# Patient Record
Sex: MALE | Race: WHITE | NOT HISPANIC OR LATINO | URBAN - METROPOLITAN AREA
[De-identification: names, ages, dates, MRNs, and addresses within clinical notes are randomized per-mention and may not be internally consistent; named-entity substitution may affect disease eponyms.]

---

## 2019-08-21 ENCOUNTER — OFFICE VISIT - RIVER FALLS (OUTPATIENT)
Dept: FAMILY MEDICINE | Facility: CLINIC | Age: 24
End: 2019-08-21

## 2019-08-30 ENCOUNTER — OFFICE VISIT - RIVER FALLS (OUTPATIENT)
Dept: FAMILY MEDICINE | Facility: CLINIC | Age: 24
End: 2019-08-30

## 2022-02-12 VITALS — HEART RATE: 68 BPM | SYSTOLIC BLOOD PRESSURE: 120 MMHG | DIASTOLIC BLOOD PRESSURE: 72 MMHG

## 2022-02-12 VITALS — TEMPERATURE: 97.8 F | HEART RATE: 60 BPM | DIASTOLIC BLOOD PRESSURE: 82 MMHG | SYSTOLIC BLOOD PRESSURE: 136 MMHG

## 2022-02-16 NOTE — NURSING NOTE
Comprehensive Intake Entered On:  8/21/2019 5:21 PM CDT    Performed On:  8/21/2019 5:18 PM CDT by Alee Smith RN               Summary   Chief Complaint :   Patient is here for laceration in between first and second finger on left hand. Happened about an hour ago. Work Comp.    Ht/Wt Measurement Refused by Patient? :   Yes   Systolic Blood Pressure :   120 mmHg   Diastolic Blood Pressure :   72 mmHg   Mean Arterial Pressure :   88 mmHg   Peripheral Pulse Rate :   68 bpm   BP Site :   Right arm   Pulse Site :   Radial artery   BP Method :   Electronic   HR Method :   Manual   Alee Smith RN - 8/21/2019 5:18 PM CDT   Health Status   Allergies Verified? :   Yes   Medication History Verified? :   Yes   Pre-Visit Planning Status :   Not completed   Alee Smith RN - 8/21/2019 5:18 PM CDT   Meds / Allergies   (As Of: 8/21/2019 5:21:10 PM CDT)   Allergies (Active)   No Known Medication Allergies  Estimated Onset Date:   Unspecified ; Created By:   Alee Smith RN; Reaction Status:   Active ; Category:   Drug ; Substance:   No Known Medication Allergies ; Type:   Allergy ; Updated By:   Alee Smith RN; Reviewed Date:   8/21/2019 5:19 PM CDT        Medication List   (As Of: 8/21/2019 5:21:10 PM CDT)

## 2022-02-16 NOTE — PROGRESS NOTES
Chief Complaint    Work comp f/u. Remove stitches.  History of Present Illness      Chief complaint as above reviewed and confirmed with patient.  Pt presents to the clinic with concerns re: suture removal L hand, WC injury 8-21-9.  Pt state he has been using hand well at work.  Avoiding a lot of Abduction but other than that very functional.  No redness, swelling, discharge. no fevers or chills.          Review of Systems      Review of systems is negative with the exception of those noted in HPI          Physical Exam   Vitals & Measurements    T: 97.8   F (Tympanic)  HR: 60(Peripheral)  BP: 136/82            exam of the L hand 2nd web space reveals  sutures to be healing well without signs of infection.       sutures removed without difficulty x 3 and steri strips placed.  pt tolerated well.          Assessment/Plan       1. Visit for suture removal (Z48.02)         keep wound clean and dry, return to work without restrictions. fu prn.  Patient Information     Name:PORFIRIO HILL      Address:      [address] [place] [id number]     Sex:Male     YOB: 1995     [phone]     MRN:[id number]     FIN:[id number]     Location:[facility]     Date of Service:08/30/2019      Primary Care Physician:       NONE ,       Attending Physician:       Cole BELTRE, Julieta ROJAS, [phone]  Problem List/Past Medical History    Ongoing     No qualifying data    Historical     No qualifying data  Medications   No active medications  Allergies    No Known Medication Allergies  Immunizations      Vaccine Date Status      tetanus/diphth/pertuss (Tdap) adult/adol 08/21/2019 Given

## 2022-02-16 NOTE — NURSING NOTE
Comprehensive Intake Entered On:  8/30/2019 4:04 PM CDT    Performed On:  8/30/2019 4:01 PM CDT by Jennifer Novak               Summary   Chief Complaint :   Work comp f/u. Remove stitches.    Systolic Blood Pressure :   136 mmHg (HI)    Diastolic Blood Pressure :   82 mmHg (HI)    Mean Arterial Pressure :   100 mmHg   Peripheral Pulse Rate :   60 bpm   Temperature Tympanic :   97.8 DegF(Converted to: 36.6 DegC)  (LOW)    Jennifer Novak - 8/30/2019 4:01 PM CDT   Health Status   Allergies Verified? :   Yes   Medication History Verified? :   Yes   Medical History Verified? :   Yes   Pre-Visit Planning Status :   Completed   Tobacco Use? :   Former smoker   Jennifer Novak - 8/30/2019 4:01 PM CDT   Consents   Consent for Immunization Exchange :   Consent Granted   Consent for Immunizations to Providers :   Consent Granted   Jennifer Novak - 8/30/2019 4:01 PM CDT   Meds / Allergies   (As Of: 8/30/2019 4:04:30 PM CDT)   Allergies (Active)   No Known Medication Allergies  Estimated Onset Date:   Unspecified ; Created By:   Alee Smith RN; Reaction Status:   Active ; Category:   Drug ; Substance:   No Known Medication Allergies ; Type:   Allergy ; Updated By:   Alee Smith RN; Reviewed Date:   8/30/2019 4:03 PM CDT        Medication List   (As Of: 8/30/2019 4:04:30 PM CDT)

## 2022-02-16 NOTE — PROGRESS NOTES
Chief Complaint    Patient is here for laceration in between first and second finger on left hand. Happened about an hour ago. Work Comp.  History of Present Illness      Chief complaint as above reviewed and confirmed with patient.  Pt presents to the clinic with concerns re: laceration to the L hand.  He was using a scraping tool and cut the L 2nd web space today while at work, 8-21-19.  Bleeding well controlled. Denies tingling or numbness. No weakness.  He is in need of tetanus update.  Review of Systems      Review of systems is negative with the exception of those noted in HPI          Physical Exam   Vitals & Measurements    HR: 68(Peripheral)  BP: 120/72            exam of the L hand reveals 1.5 cm full thickness laceration to the 2nd web space on the L.      There is no active bleeding.       sensation intact to the digits.       peripheral pulses intact, cap refill brisk.         Assessment/Plan       1. Hand laceration (S61.419A)         area was cleaned well with saline and hibaclense solution.         recommended sutures given location, slight gapping.        Pt agreeable and consent obtained.        area was prepped and draped in the usual sterile fashion.         3 cc of 1% lidocaine without epi was used to anesthetize the area.         after adequate anesthesia 3 sutures were placed to approximate the wound.  simple interrupted sutures x 3 with 5.0 Ethilon         pt tolerated well        wound dressed with light dressing.        Pt may return to work tomorrow without restrictions other than keeping clean and dry, covered at work.         discussed wound care and given PI for wound care         Return to the clinic in 10 days for suture removal.  sooner for signs of infection.  Tetanus immunization updated.                Immunization due (Z23)         Ordered:          tetanus/diphth/pertuss (Tdap) adult/adol, 0.5 mL, im, once, (Completed)          38819 imadm prq id subq/im njxs 1 vaccine (Charge),  Quantity: 1, Immunization due          49941 tdap vaccine 7/> yr im (Charge), Quantity: 1, Immunization due           Patient Information     Name:PORFIRIO HILL      Address:      [address] [place] [id number]     Sex:Male     YOB: 1995     [phone]     MRN:[id number]     FIN:[id number]     Location:[facility]     Date of Service:08/21/2019      Primary Care Physician:       NONE ,       Attending Physician:       Cole BELTRE, Julieta ROJAS, [phone]  Medications   No active medications  Allergies    No Known Medication Allergies  Immunizations      Vaccine Date Status      tetanus/diphth/pertuss (Tdap) adult/adol 08/21/2019 Given